# Patient Record
(demographics unavailable — no encounter records)

---

## 2025-02-25 NOTE — HISTORY OF PRESENT ILLNESS
[FreeTextEntry1] : Patient is a 54-year-old male presents for follow-up. [de-identified] : Patient is a 54-year-old male with past medical history significant for recurrent calcium oxalate nephrolithiasis, obesity, borderline hyperlipidemia, presents for follow-up. Patient was last seen by his urologist, Dr. Romel Uriostegui, on October 17, 2024 after bilateral ureteroscopy and removal of obstructing left ureteral stone.  Ultrasound done during visit with urologist revealed at least 2 kidney stones on the right without evidence of hydronephrosis.  Admits that he passed an additional stone after that visit.  Denies flank pain, hematuria, fever, chills.  Admits that he is not taking prostate SR supplement as was recommended by Dr. Uriostegui.  Patient requests prescription for pain medication in the event that he develops recurrence kidney stone pain.  He has been on Oxycodone-acetaminophen 5/325 mg as needed. Continues on maintenance dose of Terzepitide every 2 weeks and sees his weight loss provider once monthly.  He has gained some weight over the last couple months and will inquire about increasing the dose. Remains active at work but does not exercise regularly. Offers no new complaints.

## 2025-04-16 NOTE — PHYSICAL EXAM
[de-identified] : General Exam   Well developed, well nourished No apparent distress Oriented to person, place, and time Mood: Normal Affect: Normal Balance and coordination: Normal Gait: Normal  Right shoulder exam   Inspection: No swelling, ecchymosis or gross deformity. Skin: No masses, No lesions Tenderness: No bicipital tenderness, no tenderness to the greater tuberosity/RTC insertion, no anterior shoulder/lesser tuberosity tenderness. No tenderness SC joint, clavicle, AC joint. ROM: 160/60/T6 Impingement tests: Positive Mcdaniels AC Joint: no pain with cross arm testing Biceps: Negative speed Strength: 5/5 abduction, external rotation, and internal rotation Neuro: AIN, PIN, Ulnar nerve motor intact Sensation: Intact to light touch in radial, median, ulnar, and axillary nerve distributions Vasc: 2+ radial pulse [de-identified] : The following radiographs were ordered and read by me during this patients visit. I reviewed each radiograph in detail with the patient and discussed the findings as highlighted below.  3 views right shoulder were obtained today glenohumeral joint well-maintained normal alignment no fracture.

## 2025-04-16 NOTE — DISCUSSION/SUMMARY
[de-identified] : Right shoulder pain after from a ladder.  He has full range of motion full-strength no clinical concern regarding traumatic tear given prescription Mobic side effects discussed if not improved can consider advanced imaging.  All questions were answered

## 2025-04-16 NOTE — HISTORY OF PRESENT ILLNESS
[de-identified] : 53yo male presents complaining of right shoulder pain for 2 weeks.  He sustained a fall off a ladder about 10 to 12 feet onto the pavement.  Developed immediate pain at that time.  Reports pain lateral aspect of the shoulder.  Worse with lifting and overhead activity.  Pain gradually worsening.  He is right-hand dominant.  He tried ice, take Aleve which does help intermittently.  Denies numbness tingling  The patient's past medical history, past surgical history, medications, allergies, and social history were reviewed by me today with the patient and documented accordingly. In addition, the patient's family history, which is noncontributory to this visit, was also reviewed.

## 2025-05-05 NOTE — PHYSICAL EXAM
[Well Developed] : well developed [Well Nourished] : well nourished [No Acute Distress] : no acute distress [Obese] : obese [Normal Conjunctiva] : normal conjunctiva [Normal Venous Pressure] : normal venous pressure [No Carotid Bruit] : no carotid bruit [Normal S1, S2] : normal S1, S2 [No Rub] : no rub [No Gallop] : no gallop [Murmur] : murmur [Clear Lung Fields] : clear lung fields [Good Air Entry] : good air entry [No Respiratory Distress] : no respiratory distress  [Soft] : abdomen soft [Non Tender] : non-tender [No Masses/organomegaly] : no masses/organomegaly [Normal Bowel Sounds] : normal bowel sounds [Normal Gait] : normal gait [No Edema] : no edema [No Cyanosis] : no cyanosis [No Clubbing] : no clubbing [No Varicosities] : no varicosities [No Rash] : no rash [No Skin Lesions] : no skin lesions [Moves all extremities] : moves all extremities [No Focal Deficits] : no focal deficits [Normal Speech] : normal speech [Alert and Oriented] : alert and oriented [Normal memory] : normal memory [de-identified] : II/VI [facility] base

## 2025-05-05 NOTE — DISCUSSION/SUMMARY
[FreeTextEntry1] : Echocardiogram to eval LA, AV, murmur Encouraged contin efforts for diet, wt loss, and recommended smoking cessation Discussed relative benefits and risks of statin in this setting, and I favor initiating pharmacol treatment. He is agreeable and rx'd rosvua 10 mg, and advised repeat labs in 3 mo for lipids and LFTs.  [EKG obtained to assist in diagnosis and management of assessed problem(s)] : EKG obtained to assist in diagnosis and management of assessed problem(s)

## 2025-05-05 NOTE — HISTORY OF PRESENT ILLNESS
[FreeTextEntry1] : ITA LAROSE is a 54 year old male referred for consultation due to recent CT cor calcium score of ZERO with incidental AV and ao root mild calcification, and LAE. No prior cardiac history.  + high cholesterol.  Obesity, has lost 45 lbs over past year with SGLT-2. He does not exercise but is very active at work.  No CP, SOB, palps, dizziness.  Soc - + cigars.   Fam hist - + high cholesterol.  No premature CVD.

## 2025-06-04 NOTE — ASSESSMENT
[Vaccines Reviewed] : Immunizations reviewed today. Please see immunization details in the vaccine log within the immunization flowsheet.  [FreeTextEntry1] : Healthcare maintenance: Patient up-to-date with colonoscopy. Overdue for skin cancer screening exam.  Dermatology referral provided. Routine labs drawn today. Urology follow-up to be scheduled.

## 2025-06-04 NOTE — HISTORY OF PRESENT ILLNESS
[FreeTextEntry1] : The patient is a 54 year old male who presents for annual physical examination. [de-identified] : Patient is a 54-year-old male with past medical history significant for recurrent calcium oxalate nephrolithiasis, obesity, borderline hyperlipidemia, presents for annual wellness. Patient reports that he fell off a ladder 2 months ago and injured his right shoulder. Initially evaluated by orthopedist, Dr. Arleth Serrano.  Right shoulder x-rays did not reveal fracture. Patient admits that he continues to have discomfort in his right shoulder which has recently worsened.  Symptoms seem to be aggravated with certain movements including external rotation and lifting the arm laterally.  He has reached out to orthopedist for MRI and is awaiting a call back regarding approval.  Patient admits that he had recent appointment scheduled with his urologist, Dr. Uriostegui which he had to cancel due to scheduling conflicts. Denies recent flank pain, hematuria, dysuria. Recently seen by cardiology, Dr. Cardona.  Prescribed rosuvastatin 5 mg daily which he admits he is currently not taking. Remains on Terzepatide tied to aid with weight loss. Up-to-date with colonoscopy 10/2024 by Dr. Ngo.  Has not had skin cancer screening exam. Offers no additional complaints.

## 2025-06-04 NOTE — HISTORY OF PRESENT ILLNESS
[FreeTextEntry1] : The patient is a 54 year old male who presents for annual physical examination. [de-identified] : Patient is a 54-year-old male with past medical history significant for recurrent calcium oxalate nephrolithiasis, obesity, borderline hyperlipidemia, presents for annual wellness. Patient reports that he fell off a ladder 2 months ago and injured his right shoulder. Initially evaluated by orthopedist, Dr. Arleth Serrano.  Right shoulder x-rays did not reveal fracture. Patient admits that he continues to have discomfort in his right shoulder which has recently worsened.  Symptoms seem to be aggravated with certain movements including external rotation and lifting the arm laterally.  He has reached out to orthopedist for MRI and is awaiting a call back regarding approval.  Patient admits that he had recent appointment scheduled with his urologist, Dr. Uriostegui which he had to cancel due to scheduling conflicts. Denies recent flank pain, hematuria, dysuria. Recently seen by cardiology, Dr. Cardona.  Prescribed rosuvastatin 5 mg daily which he admits he is currently not taking. Remains on Terzepatide tied to aid with weight loss. Up-to-date with colonoscopy 10/2024 by Dr. Ngo.  Has not had skin cancer screening exam. Offers no additional complaints.

## 2025-06-04 NOTE — HEALTH RISK ASSESSMENT
[Good] : ~his/her~  mood as  good [Yes] : Yes [0] : 2) Feeling down, depressed, or hopeless: Not at all (0) [Never] : Never [With Family] : lives with family [Employed] : employed [] :  [# Of Children ___] : has [unfilled] children [Fully functional (bathing, dressing, toileting, transferring, walking, feeding)] : Fully functional (bathing, dressing, toileting, transferring, walking, feeding) [Fully functional (using the telephone, shopping, preparing meals, housekeeping, doing laundry, using] : Fully functional and needs no help or supervision to perform IADLs (using the telephone, shopping, preparing meals, housekeeping, doing laundry, using transportation, managing medications and managing finances) [Smoke Detector] : smoke detector [Carbon Monoxide Detector] : carbon monoxide detector [Seat Belt] :  uses seat belt [Sunscreen] : uses sunscreen [Designated Healthcare Proxy] : Designated healthcare proxy [Name: ___] : Health Care Proxy's Name: [unfilled]  [Relationship: ___] : Relationship: [unfilled] [Any fall with injury in past year] : Patient reported fall with injury in the past year [de-identified] : socially [de-identified] : Does a lot of physical labor at work. [[id number]] : 0 [de-identified] : cigars daily [Change in mental status noted] : No change in mental status noted [Language] : denies difficulty with language [Behavior] : denies difficulty with behavior [Handling Complex Tasks] : denies difficulty handling complex tasks [Reasoning] : denies difficulty with reasoning [Reports changes in hearing] : Reports no changes in hearing [Reports changes in vision] : Reports no changes in vision [ColonoscopyDate] : 8/2025 [ColonoscopyComments] : Dr. Ngo [de-identified] : wears reading glasses. Last eye exam 6 months ago.

## 2025-06-04 NOTE — PHYSICAL EXAM
[No Acute Distress] : no acute distress [Well Nourished] : well nourished [Well Developed] : well developed [Well-Appearing] : well-appearing [Normal Sclera/Conjunctiva] : normal sclera/conjunctiva [PERRL] : pupils equal round and reactive to light [EOMI] : extraocular movements intact [Normal Outer Ear/Nose] : the outer ears and nose were normal in appearance [Normal Oropharynx] : the oropharynx was normal [No JVD] : no jugular venous distention [No Lymphadenopathy] : no lymphadenopathy [Supple] : supple [Thyroid Normal, No Nodules] : the thyroid was normal and there were no nodules present [No Respiratory Distress] : no respiratory distress  [No Accessory Muscle Use] : no accessory muscle use [Clear to Auscultation] : lungs were clear to auscultation bilaterally [Normal Rate] : normal rate  [Regular Rhythm] : with a regular rhythm [Normal S1, S2] : normal S1 and S2 [No Murmur] : no murmur heard [No Carotid Bruits] : no carotid bruits [No Varicosities] : no varicosities [Pedal Pulses Present] : the pedal pulses are present [No Edema] : there was no peripheral edema [No Extremity Clubbing/Cyanosis] : no extremity clubbing/cyanosis [Soft] : abdomen soft [Non Tender] : non-tender [Non-distended] : non-distended [No Masses] : no abdominal mass palpated [No HSM] : no HSM [Normal Bowel Sounds] : normal bowel sounds [Normal Posterior Cervical Nodes] : no posterior cervical lymphadenopathy [Normal Anterior Cervical Nodes] : no anterior cervical lymphadenopathy [No Joint Swelling] : no joint swelling [Grossly Normal Strength/Tone] : grossly normal strength/tone [No Rash] : no rash [Coordination Grossly Intact] : coordination grossly intact [No Focal Deficits] : no focal deficits [Normal Affect] : the affect was normal [Alert and Oriented x3] : oriented to person, place, and time [Normal Insight/Judgement] : insight and judgment were intact

## 2025-06-04 NOTE — HEALTH RISK ASSESSMENT
[Good] : ~his/her~  mood as  good [Yes] : Yes [0] : 2) Feeling down, depressed, or hopeless: Not at all (0) [Never] : Never [With Family] : lives with family [Employed] : employed [] :  [# Of Children ___] : has [unfilled] children [Fully functional (bathing, dressing, toileting, transferring, walking, feeding)] : Fully functional (bathing, dressing, toileting, transferring, walking, feeding) [Fully functional (using the telephone, shopping, preparing meals, housekeeping, doing laundry, using] : Fully functional and needs no help or supervision to perform IADLs (using the telephone, shopping, preparing meals, housekeeping, doing laundry, using transportation, managing medications and managing finances) [Smoke Detector] : smoke detector [Carbon Monoxide Detector] : carbon monoxide detector [Seat Belt] :  uses seat belt [Sunscreen] : uses sunscreen [Designated Healthcare Proxy] : Designated healthcare proxy [Name: ___] : Health Care Proxy's Name: [unfilled]  [Relationship: ___] : Relationship: [unfilled] [Any fall with injury in past year] : Patient reported fall with injury in the past year [de-identified] : socially [de-identified] : Does a lot of physical labor at work. [[id number]] : 0 [de-identified] : cigars daily [Change in mental status noted] : No change in mental status noted [Language] : denies difficulty with language [Behavior] : denies difficulty with behavior [Handling Complex Tasks] : denies difficulty handling complex tasks [Reasoning] : denies difficulty with reasoning [Reports changes in hearing] : Reports no changes in hearing [Reports changes in vision] : Reports no changes in vision [ColonoscopyDate] : 8/2025 [ColonoscopyComments] : Dr. Ngo [de-identified] : wears reading glasses. Last eye exam 6 months ago.

## 2025-06-25 NOTE — DISCUSSION/SUMMARY
[de-identified] : MRI right shoulder reviewed partial tearing of the cranial insertion of the subscapularis, partial tearing of the proximal extra-articular long head biceps tendon. We discussed treatment options at length we discussed nonoperative care activity modification physical therapy with likely continued weakness we discussed surgical management with right shoulder arthroscopy rotator cuff repair of the subscapularis tendon with biceps tenodesis. We discussed the surgery postoperative protocol restrictions at length. Risk benefits alternatives discussed including not limited to risks such as bleeding infection nerve or vessel injury continued pain stiffness retear need for future surgery including reverse shoulder arthroplasty medical complications such as DVT or PE and anesthesia complications. All questions were answered.   Injection: Right shoulder biceps sheath Indication: Biceps tendinitis rotator cuff tear.   A discussion was had with the patient regarding this procedure and all questions were answered. All risks, benefits and alternatives were discussed. These included but were not limited to bleeding, infection, and allergic reaction. Alcohol was used to clean the skin, and betadine was used to sterilize and prep the area in the anterior aspect of the right shoulder. Ethyl chloride spray was then used as a topical anesthetic. A 21-gauge needle was used to inject 4cc of 1% lidocaine and 1cc of 40mg/ml methylprednisolone into the right biceps sheath and subacromial space. A sterile bandage was then applied. The patient tolerated the procedure well and there were no complications.   Ice. Tylenol and anti-inflammatory medications. Follow-up as needed. All questions were answered.

## 2025-06-25 NOTE — PHYSICAL EXAM
[de-identified] : Right shoulder exam   Inspection: No swelling, ecchymosis or gross deformity. Skin: No masses, No lesions Tenderness: No bicipital tenderness, no tenderness to the greater tuberosity/RTC insertion, no anterior shoulder/lesser tuberosity tenderness. No tenderness SC joint, clavicle, AC joint. ROM: 160/60/T6 Impingement tests: Positive Mcdaniels AC Joint: no pain with cross arm testing Biceps: Negative speed Strength: 5/5 abduction, external rotation, and internal rotation Neuro: AIN, PIN, Ulnar nerve motor intact Sensation: Intact to light touch in radial, median, ulnar, and axillary nerve distributions Vasc: 2+ radial pulse [de-identified] : EXAM: [id number] - MR SHOULDER RT  - ORDERED BY: DAYA MCGREGOR   PROCEDURE DATE:  06/16/2025    INTERPRETATION:  History: Shoulder pain, rule out rotator cuff tear  Technique: Multiplanar and multisequence MRI images were obtained through the right shoulder without contrast.  Comparison: None available  Findings:  Rotator Cuff: Mild/moderate supraspinatus and infraspinatus tendinosis without definite discrete tear. Teres minor tendon is intact. Moderate grade partial tearing of the cranial insertion of the subscapularis. Mild fatty infiltration of the infraspinatus and teres minor muscles. Minimal fatty infiltration of the supraspinatus and subscapularis muscles. No mass or fluid collection seen within the quadrilateral space or along the expected course of the suprascapular nerve. No significant subacromial/subdeltoid bursitis.  AC Joint/Acromion: Severe acromioclavicular arthrosis with inferiorly directed osteophytes.  Labrum/Capsule: No displaced labral tear seen.  Biceps Tendon: Mild medial subluxation of the proximal extra-articular long head biceps tendon from the intertubercular groove, where it is perched on the lesser tuberosity. Partial tearing of the proximal extra-articular long head biceps tendon. Moderate/severe intra-articular long head biceps tendinosis.  Joint: Moderate glenohumeral joint effusion. Articular cartilage is intact.  Bone marrow: No acute fracture. Mild subcortical cystic change/enthesopathic changes at the infraspinatus, supraspinatus, and subscapularis insertions.  Soft tissues: Unremarkable.  Impression:  Moderate grade partial tearing of the cranial insertion of the subscapularis. Rotator cuff tendinosis.  Mild medial subluxation of the proximal extra-articular long head biceps tendon from the intertubercular groove, where it is perched on the lesser tuberosity. Partial tearing of the proximal extra-articular long head biceps tendon. Moderate/severe intra-articular long head biceps tendinosis.  Severe acromioclavicular arthrosis.  Other findings as above.  --- End of Report ---

## 2025-06-25 NOTE — HISTORY OF PRESENT ILLNESS
[de-identified] : 53yo male presents complaining of right shoulder pain for 2 weeks.  He sustained a fall off a ladder about 10 to 12 feet onto the pavement.  Developed immediate pain at that time.  Reports pain lateral aspect of the shoulder.  Worse with lifting and overhead activity.  Pain gradually worsening.  He is right-hand dominant.  He tried ice, take Aleve which does help intermittently.  Denies numbness tingling He is here today to review his MRI.  The patient's past medical history, past surgical history, medications, allergies, and social history were reviewed by me today with the patient and documented accordingly. In addition, the patient's family history, which is noncontributory to this visit, was also reviewed.